# Patient Record
Sex: MALE | Race: BLACK OR AFRICAN AMERICAN | Employment: UNEMPLOYED | URBAN - METROPOLITAN AREA
[De-identification: names, ages, dates, MRNs, and addresses within clinical notes are randomized per-mention and may not be internally consistent; named-entity substitution may affect disease eponyms.]

---

## 2018-10-01 ENCOUNTER — OFFICE VISIT (OUTPATIENT)
Dept: HEMATOLOGY | Age: 58
End: 2018-10-01

## 2018-10-01 VITALS
BODY MASS INDEX: 35.58 KG/M2 | TEMPERATURE: 97.4 F | WEIGHT: 234 LBS | SYSTOLIC BLOOD PRESSURE: 142 MMHG | HEART RATE: 75 BPM | DIASTOLIC BLOOD PRESSURE: 100 MMHG

## 2018-10-01 DIAGNOSIS — B19.20 HEPATITIS C VIRUS INFECTION WITHOUT HEPATIC COMA, UNSPECIFIED CHRONICITY: Primary | ICD-10-CM

## 2018-10-01 DIAGNOSIS — C34.31 MALIGNANT NEOPLASM OF LOWER LOBE OF RIGHT LUNG (HCC): ICD-10-CM

## 2018-10-01 PROBLEM — I10 HTN (HYPERTENSION): Status: ACTIVE | Noted: 2018-10-01

## 2018-10-01 PROBLEM — C34.90 LUNG CANCER (HCC): Status: ACTIVE | Noted: 2018-10-01

## 2018-10-01 RX ORDER — OMEPRAZOLE 40 MG/1
CAPSULE, DELAYED RELEASE ORAL
Refills: 10 | COMMUNITY
Start: 2018-08-28

## 2018-10-01 RX ORDER — TRAZODONE HYDROCHLORIDE 100 MG/1
TABLET ORAL
Refills: 0 | COMMUNITY
Start: 2018-09-21

## 2018-10-01 RX ORDER — EZETIMIBE 10 MG/1
TABLET ORAL
Refills: 1 | COMMUNITY
Start: 2018-09-05

## 2018-10-01 NOTE — PROGRESS NOTES
Chief Complaint   Patient presents with    Follow-up     Fibroscan     Visit Vitals    BP (!) 142/100 (BP 1 Location: Left arm, BP Patient Position: Sitting)    Pulse 75    Temp 97.4 °F (36.3 °C) (Tympanic)    Wt 234 lb (106.1 kg)    BMI 35.58 kg/m2     PHQ over the last two weeks 10/1/2018   Little interest or pleasure in doing things Not at all   Feeling down, depressed, irritable, or hopeless Not at all   Total Score PHQ 2 0     Learning Assessment 10/1/2018   PRIMARY LEARNER Patient   BARRIERS PRIMARY LEARNER NONE   CO-LEARNER CAREGIVER No   PRIMARY LANGUAGE ENGLISH   LEARNER PREFERENCE PRIMARY LISTENING   ANSWERED BY patient   RELATIONSHIP SELF     Abuse Screening Questionnaire 10/1/2018   Do you ever feel afraid of your partner? N   Are you in a relationship with someone who physically or mentally threatens you? N   Is it safe for you to go home?  Naheed Lubin

## 2018-10-01 NOTE — PROGRESS NOTES
[address], [place], [address], [place], [place]       Vincenzo Tellez, ARCHANA Tate, DONNA-COLEEN Storey NP [address] [facility] [address]    at [address], [address] Nayeli Dickinson  22.    [phone]    FAX: [address] [place] [address], [address]    [phone]    FAX: [phone]       Patient Care Team:  Esau Lim MD as PCP - General (Family Practice)  Pierre Adkins MD (Gastroenterology)      Problem List  Date Reviewed: 10/3/2013          Codes Class Noted    Chest pain (Chronic) ICD-10-CM: R07.9  ICD-9-CM: 786.50 Minor 10/3/2013              The physicians listed above have asked me to see Yissel Bains in consultation regarding chronic HCV and to perform assessment of fibrosis by means of in-office fibroscan analysis. The patient is a 62 y.o. Black male who was diagnosed with liver disease in 7/2018. Routine testing had revealed positive HCV. This is a new diagnosis and he has had no prior course of therapy. The patient has no symptoms which could be attributed to the liver disorder. The patient completes all daily activities without any functional limitations. The patient has not experienced fatigue, fevers, chills, shortness of breath, chest pain, pain in the right side over the liver, diffuse abdominal pain, nausea, vomiting, constipation, diarrhea, dry eyes, dry mouth, arthralgias, myalgias, yellowing of the eyes or skin, itching, dark urine, problems concentrating, swelling of the abdomen, swelling of the lower extremities, hematemesis, or hematochezia.     ALLERGIES:  Allergies   Allergen Reactions    Pcn [Penicillins] Unknown (comments) MEDICATIONS:  Current Outpatient Prescriptions   Medication Sig    omeprazole (PRILOSEC) 40 mg capsule TK ONE C PO  BID BEFORE MEALS    ezetimibe (ZETIA) 10 mg tablet TK 1 T PO QD    traZODone (DESYREL) 100 mg tablet TK 1 T PO  D    albuterol (VENTOLIN HFA) 90 mcg/actuation inhaler Take 2 Puffs by inhalation every six (6) hours as needed.  OTHER Pt states taking a cholesterol medication but does not know name.  HYDROcodone-acetaminophen (NORCO) 5-325 mg per tablet Take 1 Tab by mouth every four (4) hours as needed for Pain. No current facility-administered medications for this visit. SYSTEM REVIEW NOT RELATED TO LIVER DISEASE OR REVIEWED ABOVE:  Constitution systems: Negative for fever, chills, weight gain, weight loss. Eyes: Negative for visual changes. ENT: Negative for sore throat, painful swallowing. Respiratory: Negative for cough, hemoptysis, SOB. Cardiology: Negative for chest pain, palpitations. GI:  Negative for constipation or diarrhea. : Negative for urinary frequency, dysuria, hematuria, nocturia. Skin: Negative for rash. Hematology: Negative for easy bruising, blood clots. Musculo-skeletal: Negative for back pain, muscle pain, weakness. Neurologic: Negative for headaches, dizziness, vertigo, memory problems not related to HE. Psychology: Negative for anxiety, depression. FAMILY HISTORY:  There is no family history of liver disease. SOCIAL HISTORY:  The patient is . The patient has 3 children and 6 grandchildren. The patient has never used tobacco products. The patient has previously consumed alcohol socially never in excess. The patient currently receiving disability. PHYSICAL EXAMINATION:  Visit Vitals    BP (!) 142/100 (BP 1 Location: Left arm, BP Patient Position: Sitting)    Pulse 75    Temp 97.4 °F (36.3 °C) (Tympanic)    Wt 234 lb (106.1 kg)    BMI 35.58 kg/m2     General: No acute distress.    Skin: No spider angiomata. No jaundice. No palmar erythema. Abdomen: Soft non-tender. Liver size normal to percussion/palpation. Spleen not palpable. No obvious ascites. Extremities: No edema. No muscle wasting. No gross arthritic changes. Neurologic: Alert and oriented. Cranial nerves grossly intact. No asterixis. LIVER HISTOLOGY:  10/2018. Fib[facility]Scan performed at [facility]. EkPa was 5.4. Suggested fibrosis level is F0-1. CAP score of 359, this is consistent with steatosis. ASSESSMENT AND PLAN:  Chronic HCV with no fibrosis to stage 1 portal fibrosis. Assessment of fibrosis was made through performance of fibroscan in the office today. The results of the analysis were shared with the patient in the office at the time of the procedure. A copy of the report was provided to the patient and will be forwarded to the referring medical practice. All questions were answered. The patient expressed a clear understanding of the above. Follow-up with referring physician.     Paula Medel PA-C  L[facility] [place] [address] [address] Nayeli Dickinson  22.  [phone]

## 2022-03-19 PROBLEM — C34.90 LUNG CANCER (HCC): Status: ACTIVE | Noted: 2018-10-01

## 2022-03-20 PROBLEM — I10 HTN (HYPERTENSION): Status: ACTIVE | Noted: 2018-10-01

## 2022-03-20 PROBLEM — B19.20 HEPATITIS C: Status: ACTIVE | Noted: 2018-10-01

## 2025-01-21 NOTE — CONSULTS
[facility] [place] [facility]  [facility]    Radiation Oncology Consultation    Unruly Rodriguez  [id number]  1960     Care Team:  Dr. Jose Snyder    Diagnosis   C61    The patient is a 64 y.o. male with newly diagnosed NCCN high risk prostate cancer (iP3aE2R1, iPSA 18.3 ng/mL, Reyna GG4 1/12 routine, lower GG1-3 in 9/12 routine total). 56cc gland.    AJCC Staging has been reviewed  History of Present Illness   Mr. Rodriguez is a 64 y.o. male seen in consultation at the request of Dr. Garcia to assess the role of radiation for his above diagnosis.    Oncologic History:  The patient has a past medical history of HTN, COPD (f/w PAR), non obstructive CAD, atrial flutter, RLL lobectomy (I personally reviewed path which was benign) in 2013, and HepC  12/2023 - PSA elevated at 18.3 ng/mL (previous PSA range was 3.6 to 10.21 ng/mL)  8/4/24: Prostate MRI - PIRADS 5 lesion left base/mid TZ extending to the right with diffusion restriction, prostate vol 56cc  11/14/24: Prostate biopsy - demonstrated GG4 disease (4+4=8) in 1/12 routine cores, remaining 9/12 routine cores with GG1-3 disease (in total 10/12 routine+). Fusion biopsy left base/mid TZ with GG3 disease. 56cc gland by US  12/16/24: PSMA PET - Focal PyL binding, left anterior TZ, from the base to apex. No definitive SV binding. No kely or distant metastatic disease.   Incidental heterogenous 4.4 x 4.7cm left renal mass, not clearly cystic, rec'd renal mass CT protocol for further evaluation  Renal CT Abd/Chest 2/3 and FU with Lillian 2/10/25      Mr. Rodriguez presents today with his supportive sister to discuss radiotherapy options for his prostate cancer.  On interview today, reports overall he feels well.   Denies recent weight loss, appetite changes, hematuria, abdominal pain, flank pain, or GI changes.  Is interested in definitive RT for treating his cancer.      IPSS 12 - not on meds  QOL 3  RAKAN 10    Feb 3,

## 2025-01-23 ENCOUNTER — HOSPITAL ENCOUNTER (OUTPATIENT)
Facility: HOSPITAL | Age: 65
Discharge: HOME | End: 2025-01-26

## 2025-01-23 ENCOUNTER — CLINICAL DOCUMENTATION (OUTPATIENT)
Facility: HOSPITAL | Age: 65
End: 2025-01-23

## 2025-01-23 VITALS
BODY MASS INDEX: 30.92 KG/M2 | SYSTOLIC BLOOD PRESSURE: 135 MMHG | DIASTOLIC BLOOD PRESSURE: 68 MMHG | HEART RATE: 68 BPM | WEIGHT: 204 LBS | HEIGHT: 68 IN | RESPIRATION RATE: 18 BRPM

## 2025-01-23 RX ORDER — FENOFIBRATE 160 MG/1
1 TABLET ORAL
COMMUNITY

## 2025-01-23 RX ORDER — AMLODIPINE BESYLATE 5 MG/1
5 TABLET ORAL
COMMUNITY
Start: 2024-07-22

## 2025-01-23 RX ORDER — ALBUTEROL SULFATE 90 UG/1
INHALANT RESPIRATORY (INHALATION)
COMMUNITY

## 2025-01-23 RX ORDER — OMEPRAZOLE 40 MG/1
CAPSULE, DELAYED RELEASE ORAL
COMMUNITY

## 2025-01-23 RX ORDER — ATORVASTATIN CALCIUM 20 MG/1
TABLET, FILM COATED ORAL
COMMUNITY

## 2025-01-23 RX ORDER — TRAMADOL HYDROCHLORIDE 50 MG/1
TABLET ORAL
COMMUNITY

## 2025-01-23 ASSESSMENT — PAIN SCALES - GENERAL: PAINLEVEL_OUTOF10: 0

## 2025-01-24 NOTE — PROGRESS NOTES
NCCN Distress Thermometer    Radiation Oncology at [facility]    Date Screening Completed: 1/23/25    Screening Declined:  [] Yes    Number that best describes how much distress you've experienced in the past week, including today?  0 [] - No distress 1 []      2 []      3 []      4 [x]       5 []       6 []      7 []      8 []      9 []       10 [] - Extreme distress    PROBLEM LIST  Have you had concerns about any of the items below in the past week, including today?      Physical Concerns Practical Concerns   [] Pain [] Taking care of myself    [] Sleep [] Taking care of others    [] Fatigue [] Safety   [] Tobacco use  [] Work   [] Substance use  [] School   [] Memory or concentration [x] Housing/Utilities   [] Sexual health [] Finances   [] Changes in eating  [] Insurance   [] Loss or change of physical abilities  [] Transportation    []    Emotional Concerns [] Having enough food   [x] Worry or anxiety [] Access to medicine   [] Sadness or depression [] Treatment decisions   [] Loss of interest or enjoyment     [] Grief or loss  Spiritual or Cheondoism Concerns   [] Fear [] Sense of meaning or purpose   [] Loneliness  [] Changes in rudy or beliefs   [] Anger [] Death, dying, or afterlife   [] Changes in appearance [] Conflict between beliefs and cancer treatments    [] Feelings of worthlessness or being a burden [] Relationship with the sacred    [] Ritual or dietary needs    Social Concerns     [] Relationship with spouse or partner     [] Relationship with children    [] Relationship with family members     [] Relationship with friends or coworkers     [] Communication with health care team     [] Ability to have children     [] Prejudice or discrimination        Other Concerns:

## 2025-02-07 ENCOUNTER — CLINICAL DOCUMENTATION (OUTPATIENT)
Facility: HOSPITAL | Age: 65
End: 2025-02-07

## 2025-02-07 NOTE — PROGRESS NOTES
Abdominal and Chest CT Completed at : 2/3/25    These scans demonstrate a 5cm Bosniak 4 left renal cyst and high likelihood of RCC. No significant hydronephrosis. No pathologic lymphadenopathy or distant mets.     Chest CT demonstrates left pleural nodularity. Uncertain if this is acute vs chronic as no prior images for comparison. He    I called the patient to relay results - advised him to start ADT with Dr. Lillian MACE for prostate cancer and I will refer to pulm/thoracic for tissue sampling. He did have prior lung tumor resected which was benign in 2013 (I personally reviewed path). He did work in construction.    Will send to thoracic/pulm for tissue sampling and follow up after path available to guide next steps. Patient and his partner verbalized understanding.

## 2025-03-10 ENCOUNTER — TELEPHONE (OUTPATIENT)
Facility: HOSPITAL | Age: 65
End: 2025-03-10

## 2025-03-10 DIAGNOSIS — C64.2 RENAL CARCINOMA, LEFT (HCC): ICD-10-CM

## 2025-03-10 DIAGNOSIS — C64.1 RENAL CARCINOMA, RIGHT (HCC): ICD-10-CM

## 2025-03-10 DIAGNOSIS — C61 MALIGNANT NEOPLASM OF PROSTATE (HCC): Primary | ICD-10-CM

## 2025-03-10 NOTE — PROGRESS NOTES
Pt seen by cardiothoracic surgery at [facility]. They felt that the lung findings are more c/w asbestosis and recommended we proceed with cancer directed therapy for the kidney followed by prostate. Repeat chest CT in 6mo recommended for follow up of lung findings to ensure stability.     He has started on ADT and will plan to treat the kidney first as this is more urgent and see how he does, followed by prostate SBRT if doing well after kidney treatment.

## 2025-03-10 NOTE — TELEPHONE ENCOUNTER
This writer called the patient to advise him that he needs to get blood work completed prior to his upcoming appointment on 03/19/2025.  A message was left on the voicemail.

## 2025-03-19 ENCOUNTER — HOSPITAL ENCOUNTER (OUTPATIENT)
Facility: HOSPITAL | Age: 65
Discharge: HOME | End: 2025-03-22
Attending: EMERGENCY MEDICINE

## 2025-03-19 NOTE — PROGRESS NOTES
This writer called the patient to see how he was feeling following his scan this morning.  The patient reported that he was feeling \"okay\" and denied having any further episodes of nausea, vomiting, or coughing up blood.  The patient was advised to contact the office should he need anything further.  The patient verbalized understanding.

## 2025-03-19 NOTE — PROGRESS NOTES
The patient presented for non-diagnostic IV contrast CT SIM today for radiation treatment planning purposes. An explanation of the procedure was provided and the patient verbalized understanding.  Written consent was then obtained.  Multiple attempts were made to obtain an IV.  Patient was placed in supine position with arms above head for scanning.  The patient was advised to inform staff if he felt any pain at the IV site, became short of breath, and/or felt like he had swelling in his throat, tongue, or face.  The patient verbalized understanding.  Contrast was then initiated and the patient reported feeling \"fine\" after infusion of the first 50 mL of the contrast.  Scan was started and the patient moved his leg, staff entered the room and the patient reported feeling nauseous. Patient then reported that he wanted to proceed with scan.  Scan was re-started and patient again moved his leg.  Staff re-entered the room and the patient was sat up and escorted to the bathroom.  Upon exiting the bathroom, the patient reported that he felt like he was going to \"blow up\" during the scan and then felt like he was going to \"throw up\".  He stated that \"this has never happened before\" with other CT scans.  The patient was assessed and denied feeling SOB, any swelling, or itchiness.  Patient added that he did cough up some \"bright red blood\" when he went into the bathroom and that this was new.  Scan was re-initiated and completed.  IV was removed with tip intact.  The patient was asked if he had benadryl at home and he stated that he did, but that his doctor did not like him to take it because of all of the other medications that he is on.  He was advised to drink plenty of water and go directly to the ED should he feel SOB, itching, and/or feel like his throat, tongue, or face was swelling and to inform the ED that he had IV contract administered today.  The patient was advised to inform staff of his reaction should he have

## 2025-04-21 ENCOUNTER — HOSPITAL ENCOUNTER (OUTPATIENT)
Facility: HOSPITAL | Age: 65
Discharge: HOME | End: 2025-04-24
Attending: EMERGENCY MEDICINE

## 2025-04-21 LAB
RAD ONC ARIA COURSE FIRST TREATMENT DATE: NORMAL
RAD ONC ARIA COURSE ID: NORMAL
RAD ONC ARIA COURSE LAST TREATMENT DATE: NORMAL
RAD ONC ARIA COURSE SESSION NUMBER: 1
RAD ONC ARIA COURSE START DATE: NORMAL
RAD ONC ARIA COURSE TREATMENT ELAPSED DAYS: 0
RAD ONC ARIA PLAN FRACTIONS TREATED TO DATE: 1
RAD ONC ARIA PLAN ID: NORMAL
RAD ONC ARIA PLAN PRESCRIBED DOSE PER FRACTION: 14 GY
RAD ONC ARIA PLAN PRIMARY REFERENCE POINT: NORMAL
RAD ONC ARIA PLAN TOTAL FRACTIONS PRESCRIBED: 3
RAD ONC ARIA PLAN TOTAL PRESCRIBED DOSE: 4200 CGY
RAD ONC ARIA REFERENCE POINT DOSAGE GIVEN TO DATE: 14 GY
RAD ONC ARIA REFERENCE POINT DOSAGE GIVEN TO DATE: 19.09 GY
RAD ONC ARIA REFERENCE POINT ID: NORMAL
RAD ONC ARIA REFERENCE POINT ID: NORMAL
RAD ONC ARIA REFERENCE POINT SESSION DOSAGE GIVEN: 14 GY
RAD ONC ARIA REFERENCE POINT SESSION DOSAGE GIVEN: 19.09 GY

## 2025-04-23 ENCOUNTER — HOSPITAL ENCOUNTER (OUTPATIENT)
Facility: HOSPITAL | Age: 65
Discharge: HOME | End: 2025-04-26
Attending: EMERGENCY MEDICINE

## 2025-04-23 LAB
RAD ONC ARIA COURSE FIRST TREATMENT DATE: NORMAL
RAD ONC ARIA COURSE ID: NORMAL
RAD ONC ARIA COURSE LAST TREATMENT DATE: NORMAL
RAD ONC ARIA COURSE SESSION NUMBER: 2
RAD ONC ARIA COURSE START DATE: NORMAL
RAD ONC ARIA COURSE TREATMENT ELAPSED DAYS: 2
RAD ONC ARIA PLAN FRACTIONS TREATED TO DATE: 2
RAD ONC ARIA PLAN ID: NORMAL
RAD ONC ARIA PLAN PRESCRIBED DOSE PER FRACTION: 14 GY
RAD ONC ARIA PLAN PRIMARY REFERENCE POINT: NORMAL
RAD ONC ARIA PLAN TOTAL FRACTIONS PRESCRIBED: 3
RAD ONC ARIA PLAN TOTAL PRESCRIBED DOSE: 4200 CGY
RAD ONC ARIA REFERENCE POINT DOSAGE GIVEN TO DATE: 28 GY
RAD ONC ARIA REFERENCE POINT DOSAGE GIVEN TO DATE: 38.17 GY
RAD ONC ARIA REFERENCE POINT ID: NORMAL
RAD ONC ARIA REFERENCE POINT ID: NORMAL
RAD ONC ARIA REFERENCE POINT SESSION DOSAGE GIVEN: 14 GY
RAD ONC ARIA REFERENCE POINT SESSION DOSAGE GIVEN: 19.09 GY

## 2025-04-25 ENCOUNTER — CLINICAL DOCUMENTATION (OUTPATIENT)
Facility: HOSPITAL | Age: 65
End: 2025-04-25

## 2025-04-25 ENCOUNTER — HOSPITAL ENCOUNTER (OUTPATIENT)
Facility: HOSPITAL | Age: 65
Discharge: HOME | End: 2025-04-28
Attending: EMERGENCY MEDICINE

## 2025-04-25 LAB
RAD ONC ARIA COURSE FIRST TREATMENT DATE: NORMAL
RAD ONC ARIA COURSE ID: NORMAL
RAD ONC ARIA COURSE LAST TREATMENT DATE: NORMAL
RAD ONC ARIA COURSE SESSION NUMBER: 3
RAD ONC ARIA COURSE START DATE: NORMAL
RAD ONC ARIA COURSE TREATMENT ELAPSED DAYS: 4
RAD ONC ARIA PLAN FRACTIONS TREATED TO DATE: 3
RAD ONC ARIA PLAN ID: NORMAL
RAD ONC ARIA PLAN PRESCRIBED DOSE PER FRACTION: 14 GY
RAD ONC ARIA PLAN PRIMARY REFERENCE POINT: NORMAL
RAD ONC ARIA PLAN TOTAL FRACTIONS PRESCRIBED: 3
RAD ONC ARIA PLAN TOTAL PRESCRIBED DOSE: 4200 CGY
RAD ONC ARIA REFERENCE POINT DOSAGE GIVEN TO DATE: 42 GY
RAD ONC ARIA REFERENCE POINT DOSAGE GIVEN TO DATE: 57.26 GY
RAD ONC ARIA REFERENCE POINT ID: NORMAL
RAD ONC ARIA REFERENCE POINT ID: NORMAL
RAD ONC ARIA REFERENCE POINT SESSION DOSAGE GIVEN: 14 GY
RAD ONC ARIA REFERENCE POINT SESSION DOSAGE GIVEN: 19.09 GY

## 2025-04-25 RX ORDER — TAMSULOSIN HYDROCHLORIDE 0.4 MG/1
0.4 CAPSULE ORAL DAILY
Qty: 60 CAPSULE | Refills: 1 | Status: SHIPPED | OUTPATIENT
Start: 2025-04-25 | End: 2025-08-23

## 2025-04-25 ASSESSMENT — PATIENT HEALTH QUESTIONNAIRE - PHQ9
2. FEELING DOWN, DEPRESSED OR HOPELESS: NOT AT ALL
1. LITTLE INTEREST OR PLEASURE IN DOING THINGS: NOT AT ALL
SUM OF ALL RESPONSES TO PHQ QUESTIONS 1-9: 0

## 2025-04-25 ASSESSMENT — PAIN SCALES - GENERAL: PAINLEVEL_OUTOF10: 0

## 2025-04-25 NOTE — PROGRESS NOTES
[facility] [place] [facility]  [facility]    Radiation Oncology Weekly Progress Note    Encounter Date: 04/25/25     Unruly Rodriguez  [id number]  1960     Care Team:  Dr. Jose Snyder    Diagnosis   C61; C64. 2 (RCC)    Mr. Rodriguez is a 64 y.o. man with both renal cell carcinoma and prostate cancer as outlined below:    Renal cell carcinoma - cT1N0/Stage I radio graphically diagnosed/Bosniak 4, 5 cm left renal mass. S/p definitive SBRT EOT 4/25/25  Prostate Cancer - NCCN high risk prostate cancer (lA3aA0R8, iPSA 18.3 ng/mL, Reyna GG4 1/12 routine, lower GG1-3 in 9/12 routine total). 56cc gland. S/p ADT (Lupron started 3/56/2025).    AJCC Staging has been reviewed.  Oncologic History     The patient has a past medical history of HTN, COPD (f/w PAR), non obstructive CAD, atrial flutter, RLL lobectomy (I personally reviewed path which was benign) in 2013, and HepC  12/2023 - PSA elevated at 18.3 ng/mL (previous PSA range was 3.6 to 10.21 ng/mL)  8/4/24: Prostate MRI - PIRADS 5 lesion left base/mid TZ extending to the right with diffusion restriction, prostate vol 56cc  11/14/24: Prostate biopsy - demonstrated GG4 disease (4+4=8) in 1/12 routine cores, remaining 9/12 routine cores with GG1-3 disease (in total 10/12 routine+). Fusion biopsy left base/mid TZ with GG3 disease. 56cc gland by US  12/16/24: PSMA PET - Focal PyL binding, left anterior TZ, from the base to apex. No definitive SV binding. No kely or distant metastatic disease.   Incidental heterogenous 4.4 x 4.7cm left renal mass, not clearly cystic, rec'd renal mass CT protocol for further evaluation  2/3/25 - renal protocol CT. Demonstrated a 5cm multiloculated medial left renal cystic mass with high probability of renal cell carcinoma. Mass is adjacent to the left renal pelvis and causes no significant hydronephrosis.  3/4/25: PSA 17.53; T 126.1  3/4/25: 6mo Lupron administered  4/21/25 - 4/25/25: definitive SBRT

## 2025-04-25 NOTE — PROGRESS NOTES
[facility] [place] at [facility]  [facility]    Radiation Oncology End of Treatment Summary    Unruly Rodriguez  [id number]  1960     Care Team:  Dr. Lillian Garcia    Diagnosis   C61; C64. 2 (RCC)     Mr. Rodriguez is a 64 y.o. man with both renal cell carcinoma and prostate cancer as outlined below:     Renal cell carcinoma - cT1N0/Stage I radio graphically diagnosed/Bosniak 4, 5 cm left renal mass. S/p definitive SBRT EOT 4/25/25  Prostate Cancer - NCCN high risk prostate cancer ([id number], iPSA 18.3 ng/mL, [place] GG4 1/12 routine, lower GG1-3 in 9/12 routine total). 56cc gland. S/p ADT (Lupron started 3/56/2025).    Oncologic History     The patient has a past medical history of HTN, COPD (f/w PAR), non obstructive CAD, atrial flutter, RLL lobectomy (I personally reviewed path which was benign) in 2013, and HepC  12/2023 - PSA elevated at 18.3 ng/mL (previous PSA range was 3.6 to 10.21 ng/mL)  8/4/24: Prostate MRI - PIRADS 5 lesion left base/mid TZ extending to the right with diffusion restriction, prostate vol 56cc  11/14/24: Prostate biopsy - demonstrated GG4 disease (4+4=8) in 1/12 routine cores, remaining 9/12 routine cores with GG1-3 disease (in total 10/12 routine+). Fusion biopsy left base/mid TZ with GG3 disease. 56cc gland by US  12/16/24: PSMA PET - Focal PyL binding, left anterior TZ, from the base to apex. No definitive SV binding. No kely or distant metastatic disease.   Incidental heterogenous 4.4 x 4.7cm left renal mass, not clearly cystic, rec'd renal mass CT protocol for further evaluation  2/3/25 - renal protocol CT. Demonstrated a 5cm multiloculated medial left renal cystic mass with high probability of renal cell carcinoma. Mass is adjacent to the left renal pelvis and causes no significant hydronephrosis.  3/4/25: PSA 17.53; T 126.1  3/4/25: 6mo Lupron administered  4/21/25 - 4/25/25: definitive SBRT to the left kidney, 42Gy/3Fx.         He was recommended

## 2025-06-03 ENCOUNTER — CLINICAL DOCUMENTATION (OUTPATIENT)
Facility: HOSPITAL | Age: 65
End: 2025-06-03

## 2025-06-03 ENCOUNTER — HOSPITAL ENCOUNTER (OUTPATIENT)
Facility: HOSPITAL | Age: 65
Discharge: HOME | End: 2025-06-06

## 2025-06-03 NOTE — PROGRESS NOTES
[facility] [place] at [facility]  [facility]    Radiation Oncology End of Treatment Summary    Unruly Rodriguez  [id number]  1960     Care Team:  ***    Diagnosis   ***    Oncologic History   Mr. Rodriguez is a 65 y.o. male initially seen in consultation to assess the overall management and role of radiation for his above diagnosis.    ***    Treatment Details:     Treating Center: [facility] [place] at {[facility] multiple:[id number]:o:\"[facility]\",\"[facility]\",\"[facility]\"}    Treatment Details:   Treatment Site Dose/Fx (cGy) #Fx Delivered Dose (cGy) Start Date End Date Elapsed Days   *** *** *** *** *** *** ***     Concurrent Therapy: {MKDCONCURRENT:17853}    Radiation Treatments       Active   No active radiation treatments to show.     Historical   Plans   [id number]   Most recent treatment: Dose planned: 1,400 cGy (fraction 3 on 4/25/2025)   Total: Dose planned: 4,200 cGy (3 fractions)   Elapsed Days: 4      Reference Points   [id number]   Most recent treatment: Dose given: 1,400 cGy (on 4/25/2025)   Total: Dose given: 4,200 cGy   Elapsed Days: 4      [email or web address]   Most recent treatment: Dose given: 1,909 cGy (on 4/25/2025)   Total: Dose given: 5,726 cGy   Elapsed Days: 4                     Under-treatment Course Summary   He completed radiation without any unexpected side effects to treatment. There were no significant treatment delays or missed treatments during their radiotherapy course.    The patient experienced the following acute toxicities during their radiotherapy course:  ***    Follow Up Plan   - RTC in *** with myself  - Continue FU with the remainder of your oncologic team        Thank you for the opportunity to participate in Mr. Rodriguez's care.    Lakshmi Jiménez, DO  [facility]  [facility]  [address] [place] [address]  P: [phone]  [facility]

## 2025-06-04 NOTE — CONSULTS
to the bladder/urethra causing urinary frequency, urgency, and/or burning, inflammation to the rectum/bowel causing diarrhea/loose stools, abdominal cramping/bloating, nausea/vomiting, anorexia, skin reactions (erythema, itching, dryness, possible desquamation), hair loss, urinary retention, reductions in blood count. Long-term effects include persistent skin changes, urinary symptoms, changes in sperm count, sexual dysfunction, persistent hair loss, delayed wound healing, rectal/bladder bleeding, scarring to the urinary/GI tract, lymphedema, fistula, and secondary malignancy.    He has started on ADT (3/2025) given concurrent diagnosis of RCC. I had recommended initial treatment with SBRT for RCC (left) and then see how he tolerates this prior to proceeding with prostate treatment. He is most interested at this time in proceeding with prostate RT. He is interested in SBRT with spaceOAR and fiducial placement prior to treatment planning which we will coordinate with .    I have recommended 18mo ADT total for him.     He is in agreement with the plan outlined above and is ready to proceed with the next steps outlined below.    Plan   -- DECIPHER was ordered 1/2025 - will follow up with [facility] as the result is not available/seen in their system  -- 2/2025 - colonoscopy completed. They recommended 5 year repeat due to polyps found  -- SpaceOAR/Fiducials ordered at   -- CT SIM/MRI to follow Space/OAR and fiducial placement        He prefers to have his treatment at [facility]  --  Thank you for the opportunity to participate in the care of Mr. Rodriguez. Please feel free to contact me with any questions or concerns.    Lakshmi Jiménez, DO  [facility]  [facility]  [address] [place] [address]  P: [phone]  [facility] [address], [place] [address]  P: [phone]  [facility]  [address] [place] [address], [address],

## 2025-06-25 ENCOUNTER — TRANSCRIBE ORDERS (OUTPATIENT)
Facility: HOSPITAL | Age: 65
End: 2025-06-25

## 2025-06-25 DIAGNOSIS — C61 MALIGNANT NEOPLASM OF PROSTATE (HCC): Primary | ICD-10-CM

## 2025-08-14 ENCOUNTER — HOSPITAL ENCOUNTER (OUTPATIENT)
Facility: HOSPITAL | Age: 65
Discharge: HOME | End: 2025-08-17
Attending: EMERGENCY MEDICINE
Payer: MEDICARE

## 2025-08-14 DIAGNOSIS — C61 MALIGNANT NEOPLASM OF PROSTATE (HCC): ICD-10-CM

## 2025-08-14 PROCEDURE — 76498 UNLISTED MR PROCEDURE: CPT

## 2025-08-21 ENCOUNTER — HOSPITAL ENCOUNTER (OUTPATIENT)
Facility: HOSPITAL | Age: 65
Discharge: HOME | End: 2025-08-24
Attending: EMERGENCY MEDICINE

## 2025-09-02 RX ORDER — TAMSULOSIN HYDROCHLORIDE 0.4 MG/1
0.4 CAPSULE ORAL DAILY
Qty: 60 CAPSULE | Refills: 1 | Status: SHIPPED | OUTPATIENT
Start: 2025-09-02 | End: 2025-12-31